# Patient Record
Sex: MALE | Race: WHITE | NOT HISPANIC OR LATINO | URBAN - NONMETROPOLITAN AREA
[De-identification: names, ages, dates, MRNs, and addresses within clinical notes are randomized per-mention and may not be internally consistent; named-entity substitution may affect disease eponyms.]

---

## 2017-12-12 ENCOUNTER — TRANSCRIBE ORDERS (OUTPATIENT)
Dept: PODIATRY | Facility: CLINIC | Age: 62
End: 2017-12-12

## 2017-12-12 DIAGNOSIS — L84 CORN OF FOOT: Primary | ICD-10-CM

## 2017-12-27 ENCOUNTER — OFFICE VISIT (OUTPATIENT)
Dept: PODIATRY | Facility: CLINIC | Age: 62
End: 2017-12-27

## 2017-12-27 VITALS
DIASTOLIC BLOOD PRESSURE: 90 MMHG | HEIGHT: 74 IN | OXYGEN SATURATION: 95 % | BODY MASS INDEX: 22.49 KG/M2 | HEART RATE: 90 BPM | SYSTOLIC BLOOD PRESSURE: 149 MMHG | WEIGHT: 175.27 LBS

## 2017-12-27 DIAGNOSIS — L85.1 ACQUIRED PLANTAR KERATODERMA: ICD-10-CM

## 2017-12-27 DIAGNOSIS — M79.672 PAIN IN BOTH FEET: Primary | ICD-10-CM

## 2017-12-27 DIAGNOSIS — M79.671 PAIN IN BOTH FEET: Primary | ICD-10-CM

## 2017-12-27 PROCEDURE — 99213 OFFICE O/P EST LOW 20 MIN: CPT | Performed by: PODIATRIST

## 2017-12-27 RX ORDER — TIOTROPIUM BROMIDE 18 UG/1
CAPSULE ORAL; RESPIRATORY (INHALATION)
Refills: 3 | COMMUNITY
Start: 2017-12-08

## 2017-12-27 RX ORDER — ALBUTEROL SULFATE 90 UG/1
AEROSOL, METERED RESPIRATORY (INHALATION)
Refills: 5 | COMMUNITY
Start: 2017-12-05

## 2017-12-27 RX ORDER — TAMSULOSIN HYDROCHLORIDE 0.4 MG/1
CAPSULE ORAL
Refills: 2 | COMMUNITY
Start: 2017-12-21

## 2017-12-27 RX ORDER — LISINOPRIL 20 MG/1
TABLET ORAL
Refills: 1 | COMMUNITY
Start: 2017-11-18

## 2017-12-27 NOTE — PROGRESS NOTES
Alan Sánchez  1955  62 y.o. male     Patient presents today with a complaint of bilateral foot pain; R>L.    12/27/2017  Chief Complaint   Patient presents with   • Left Foot - Pain, Callouses   • Right Foot - Pain, Callouses           History of Present Illness    Alan Sánchez is a 62 y.o.male who presents to clinic with chief complaint of foot pain.  Pain is located to the bottom of both of his feet.  He states there are calluses on the bottom of both feet.  The right foot hurts worse than the left.  He rates it as a 5 out of 10.  Describes the pain as sharp.  Pain is aggravated with weightbearing and relieved with rest.  He denies trauma.  He has no other pedal complaints.          Past Medical History:   Diagnosis Date   • Hypertension          History reviewed. No pertinent surgical history.      Family History   Problem Relation Age of Onset   • Cancer Father    • Cancer Brother        No Known Allergies    Social History     Social History   • Marital status:      Spouse name: N/A   • Number of children: N/A   • Years of education: N/A     Occupational History   • Not on file.     Social History Main Topics   • Smoking status: Current Every Day Smoker     Types: Cigarettes   • Smokeless tobacco: Never Used   • Alcohol use No   • Drug use: No   • Sexual activity: Defer     Other Topics Concern   • Not on file     Social History Narrative   • No narrative on file         Current Outpatient Prescriptions   Medication Sig Dispense Refill   • lisinopril (PRINIVIL,ZESTRIL) 20 MG tablet TAKE 1 TABLET BY MOUTH TWO TIMES A DAY AS DIRECTED ORALLY 30  1   • QVAR 80 MCG/ACT inhaler INHALE 2 PUFFS TWICE A DAY INHALATION 1 MONTH  3   • SPIRIVA HANDIHALER 18 MCG per inhalation capsule USE 1 CAPSULE ONCE A DAY INHALATION 1 MONTH  3   • tamsulosin (FLOMAX) 0.4 MG capsule 24 hr capsule TAKE 2 CAPSULE 30 MINUTES AFTER SAME MEAL ONCE A DAY ORALLY 30 DAY(S)  2   • VENTOLIN  (90 Base) MCG/ACT inhaler TAKE 2  "PUFFS EVERY 4 HOURS AS NEEDED  5     No current facility-administered medications for this visit.          OBJECTIVE    /90  Pulse 90  Ht 188 cm (74\")  Wt 79.5 kg (175 lb 4.3 oz)  SpO2 95%  BMI 22.5 kg/m2      Review of Systems   Constitutional: Negative.  Negative for activity change.   HENT: Negative.  Negative for congestion.    Eyes: Negative.  Negative for discharge.   Respiratory: Positive for shortness of breath.    Cardiovascular: Negative.  Negative for chest pain.   Gastrointestinal: Negative.  Negative for abdominal distention.   Endocrine: Negative.  Negative for cold intolerance.   Genitourinary: Negative.  Negative for difficulty urinating.   Musculoskeletal:        Bilateral foot pain   Skin: Negative.  Negative for color change.   Allergic/Immunologic: Negative.  Negative for environmental allergies.   Neurological: Negative.  Negative for dizziness.   Hematological: Negative.  Negative for adenopathy.   Psychiatric/Behavioral: Negative.  Negative for agitation.         Constitutional: well developed, well nourished    HEENT: Normocephalic and atraumatic, normal hearing    Respiratory: Non labored respirations noted    Cardiovascular:    DP/PT pulses palpable    CFT brisk  to all digits  Skin temp is warm to warm from proximal tibia to distal digits  Pedal hair growth present.   No erythema or edema noted     Musculoskeletal:  Muscle strength is 5/5 for all muscle groups tested   ROM of the 1st MTP is full without pain or crepitus  ROM of the MTJ is full without pain or crepitus    ROM of the STJ is full without pain or crepitus    ROM of the ankle joint is full without pain or crepitus    Contracted digits bilateral    Dermatological:   Skin is warm, dry and intact    Webspaces 1-4 bilateral are clean, dry and intact.   No subcutaneous nodules or masses noted    Hyperkeratotic lesions with central nucleated core noted to the plantar left fifth metatarsal head and plantar right second " metatarsal head.  There is pain on direct palpation of these hyperkeratotic lesions.    Neurological:   Protective sensation intact    Sensation intact to light touch    DTR intact    Psychiatric: A&O x 3 with normal mood and affect. NAD.       Procedures        ASSESSMENT AND PLAN    Alan was seen today for pain, callouses, pain and callouses.    Diagnoses and all orders for this visit:    Pain in both feet    Acquired plantar keratoderma    - Comprehensive foot and ankle exam performed.   - Trimmed hyperkeratotic lesions noted in objective with a #15 blade without incident.  - educated pt on proper callus care  - rx for urea 40% cream, use bid  - All questions were answered to the patients satisfaction.  - RTC as needed           This document has been electronically signed by Kael Garza DPM on December 27, 2017 4:22 PM     12/27/2017  4:22 PM